# Patient Record
Sex: MALE | Race: WHITE | NOT HISPANIC OR LATINO | Employment: STUDENT | URBAN - METROPOLITAN AREA
[De-identification: names, ages, dates, MRNs, and addresses within clinical notes are randomized per-mention and may not be internally consistent; named-entity substitution may affect disease eponyms.]

---

## 2020-06-01 ENCOUNTER — OFFICE VISIT (OUTPATIENT)
Dept: FAMILY MEDICINE CLINIC | Facility: CLINIC | Age: 25
End: 2020-06-01

## 2020-06-01 VITALS
HEIGHT: 69 IN | BODY MASS INDEX: 30.21 KG/M2 | SYSTOLIC BLOOD PRESSURE: 122 MMHG | DIASTOLIC BLOOD PRESSURE: 80 MMHG | OXYGEN SATURATION: 98 % | WEIGHT: 204 LBS | HEART RATE: 60 BPM

## 2020-06-01 DIAGNOSIS — R74.8 ELEVATED LIVER ENZYMES: ICD-10-CM

## 2020-06-01 DIAGNOSIS — Z13.1 SCREENING FOR DIABETES MELLITUS: ICD-10-CM

## 2020-06-01 DIAGNOSIS — R10.11 RUQ PAIN: ICD-10-CM

## 2020-06-01 DIAGNOSIS — Z13.6 SCREENING FOR CARDIOVASCULAR CONDITION: ICD-10-CM

## 2020-06-01 DIAGNOSIS — Z00.00 PREVENTATIVE HEALTH CARE: Primary | ICD-10-CM

## 2020-06-01 DIAGNOSIS — Z23 NEED FOR MMR VACCINE: ICD-10-CM

## 2020-06-01 DIAGNOSIS — F41.9 ANXIETY: ICD-10-CM

## 2020-06-01 DIAGNOSIS — R63.5 WEIGHT GAIN: ICD-10-CM

## 2020-06-01 DIAGNOSIS — E55.9 VITAMIN D DEFICIENCY: ICD-10-CM

## 2020-06-01 DIAGNOSIS — K21.9 GASTROESOPHAGEAL REFLUX DISEASE, ESOPHAGITIS PRESENCE NOT SPECIFIED: ICD-10-CM

## 2020-06-01 DIAGNOSIS — Z23 NEED FOR HEPATITIS B VACCINATION: ICD-10-CM

## 2020-06-01 PROCEDURE — 90471 IMMUNIZATION ADMIN: CPT | Performed by: INTERNAL MEDICINE

## 2020-06-01 PROCEDURE — 90746 HEPB VACCINE 3 DOSE ADULT IM: CPT | Performed by: INTERNAL MEDICINE

## 2020-06-01 PROCEDURE — 90707 MMR VACCINE SC: CPT | Performed by: INTERNAL MEDICINE

## 2020-06-01 PROCEDURE — 99385 PREV VISIT NEW AGE 18-39: CPT | Performed by: INTERNAL MEDICINE

## 2020-06-01 PROCEDURE — 90472 IMMUNIZATION ADMIN EACH ADD: CPT | Performed by: INTERNAL MEDICINE

## 2020-06-01 RX ORDER — BUSPIRONE HYDROCHLORIDE 5 MG/1
5 TABLET ORAL 2 TIMES DAILY
COMMUNITY
Start: 2020-05-08 | End: 2020-06-02 | Stop reason: SDUPTHER

## 2020-06-01 RX ORDER — HYDROXYZINE PAMOATE 25 MG/1
25 CAPSULE ORAL 3 TIMES DAILY PRN
COMMUNITY

## 2020-06-02 RX ORDER — BUSPIRONE HYDROCHLORIDE 5 MG/1
5 TABLET ORAL 2 TIMES DAILY
Qty: 180 TABLET | Refills: 3 | Status: SHIPPED | OUTPATIENT
Start: 2020-06-02 | End: 2020-10-05

## 2020-06-02 NOTE — PROGRESS NOTES
Chief Complaint   Patient presents with   • Annual Exam   • Immunizations     hep B, MMR       HPI:  Ralph Mckeon is a 24 y.o. male who presents today for annual exam and establish care.  Generalized anxiety-stable on BuSpar twice daily, hydroxyzine as needed, rare use.  History of elevated liver enzymes- on previous ultrasound.  He does have some right upper quadrant tenderness after large high fatty meals.  Symptoms are rare.  GERD-Tums as needed.    ROS:  Constitutional: no fevers, night sweats or unexplained weight loss  Eyes: no vision changes  ENT: no runny nose, ear pain, sore throat  Cardio: no chest pain, palpitations  Pulm: no shortness of breath, wheezing, or cough  GI: +abdominal pain - changes in bowel movements  : no difficulty urinating  MSK: no difficulty ambulating, no joint pain  Neuro: no weakness, dizziness or headache  Psych: no trouble sleeping  Endo: no change in appetite      Past Medical History:   Diagnosis Date   • Anxiety       Family History   Problem Relation Age of Onset   • Heart disease Mother    • Cancer Mother    • Kidney disease Mother    • Diabetes Mother    • Diabetes Maternal Grandmother    • Cancer Maternal Grandmother    • Heart disease Maternal Grandmother    • Kidney disease Maternal Grandmother       Social History     Socioeconomic History   • Marital status: Single     Spouse name: Not on file   • Number of children: Not on file   • Years of education: Not on file   • Highest education level: Not on file   Tobacco Use   • Smoking status: Never Smoker   • Smokeless tobacco: Never Used   Substance and Sexual Activity   • Alcohol use: Yes     Alcohol/week: 1.0 - 2.0 standard drinks     Types: 1 - 2 Glasses of wine per week     Frequency: Never     Comment: socially   • Drug use: Never   • Sexual activity: Defer      Allergies   Allergen Reactions   • Ceclor [Cefaclor] Rash      Immunization History   Administered Date(s) Administered   • Hepatitis B Vaccine Adult IM  06/01/2020   • MMR 06/01/2020        PE:  Vitals:    06/01/20 1638   BP: 122/80   Pulse: 60   SpO2: 98%      Body mass index is 29.7 kg/m².    Gen Appearance: NAD  HEENT: Normocephalic, PERRLA, no thyromegaly, trache midline  Heart: RRR, normal S1 and S2, no murmur  Lungs: CTA b/l, no wheezing, no crackles  Abdomen: Soft, non-tender, non-distended, no guarding and BSx4  MSK: Moves all extremities well, normal gait, no peripheral edema  Pulses: Palpable and equal b/l  Lymph nodes: No palpable lymphadenopathy   Neuro: No focal deficits      Current Outpatient Medications   Medication Sig Dispense Refill   • busPIRone (BUSPAR) 5 MG tablet Take 1 tablet by mouth 2 (Two) Times a Day. 180 tablet 3   • hydrOXYzine pamoate (VISTARIL) 25 MG capsule Take 25 mg by mouth 3 (Three) Times a Day As Needed for Itching or Anxiety.       No current facility-administered medications for this visit.         Ralph was seen today for annual exam and immunizations.    Diagnoses and all orders for this visit:    Preventative health care  Counseled on healthy weight, nutrition, physical activity, cancer screening, and immunizations.    Need for hepatitis B vaccination  -     Hepatitis B Vaccine Adult IM    Need for MMR vaccine  -     MMR Vaccine Subcutaneous    Screening for cardiovascular condition  -     CBC & Differential; Future  -     Comprehensive Metabolic Panel; Future  -     Hemoglobin A1c; Future  -     Lipid Panel; Future  -     Urinalysis With Culture If Indicated - Urine, Clean Catch; Future    Screening for diabetes mellitus  -     Hemoglobin A1c; Future  -     Urinalysis With Culture If Indicated - Urine, Clean Catch; Future    Elevated liver enzymes  -     CBC & Differential; Future  -     Comprehensive Metabolic Panel; Future  -     Urinalysis With Culture If Indicated - Urine, Clean Catch; Future  Likely fatty liver based on history.  Counseled on weight loss and increasing physical activity.  Will need yearly liver  enzymes.  Gastroesophageal reflux disease, esophagitis presence not specified  Continue Tums as needed.  Recommend Pepcid if symptoms persist.  Anxiety  -     busPIRone (BUSPAR) 5 MG tablet; Take 1 tablet by mouth 2 (Two) Times a Day.    RUQ pain  -     CBC & Differential; Future  -     Comprehensive Metabolic Panel; Future  -     Hemoglobin A1c; Future  -     Lipid Panel; Future  -     Urinalysis With Culture If Indicated - Urine, Clean Catch; Future  Checking blood work.  Vitamin D deficiency  -     Vitamin D 25 Hydroxy; Future  History of vitamin D deficiency, currently not taking supplements.  Weight gain  -     Urinalysis With Culture If Indicated - Urine, Clean Catch; Future  -     TSH+Free T4; Future         Return in about 1 year (around 6/1/2021) for Annual.     Please note that portions of this document were completed with a voice recognition program. Efforts were made to edit the dictations, but occasionally words are mis-transcribed.

## 2020-06-03 ENCOUNTER — LAB (OUTPATIENT)
Dept: LAB | Facility: HOSPITAL | Age: 25
End: 2020-06-03

## 2020-06-03 DIAGNOSIS — Z13.6 SCREENING FOR CARDIOVASCULAR CONDITION: ICD-10-CM

## 2020-06-03 DIAGNOSIS — Z13.1 SCREENING FOR DIABETES MELLITUS: ICD-10-CM

## 2020-06-03 DIAGNOSIS — R74.8 ELEVATED LIVER ENZYMES: ICD-10-CM

## 2020-06-03 DIAGNOSIS — R10.11 RUQ PAIN: ICD-10-CM

## 2020-06-03 DIAGNOSIS — E55.9 VITAMIN D DEFICIENCY: ICD-10-CM

## 2020-06-03 DIAGNOSIS — R63.5 WEIGHT GAIN: ICD-10-CM

## 2020-06-03 LAB
25(OH)D3 SERPL-MCNC: 18.5 NG/ML (ref 30–100)
ALBUMIN SERPL-MCNC: 4.6 G/DL (ref 3.5–5.2)
ALBUMIN/GLOB SERPL: 2.1 G/DL
ALP SERPL-CCNC: 71 U/L (ref 39–117)
ALT SERPL W P-5'-P-CCNC: 15 U/L (ref 1–41)
ANION GAP SERPL CALCULATED.3IONS-SCNC: 10.3 MMOL/L (ref 5–15)
AST SERPL-CCNC: 20 U/L (ref 1–40)
BASOPHILS # BLD AUTO: 0.04 10*3/MM3 (ref 0–0.2)
BASOPHILS NFR BLD AUTO: 0.6 % (ref 0–1.5)
BILIRUB SERPL-MCNC: 0.4 MG/DL (ref 0.2–1.2)
BUN BLD-MCNC: 14 MG/DL (ref 6–20)
BUN/CREAT SERPL: 21.9 (ref 7–25)
CALCIUM SPEC-SCNC: 9.4 MG/DL (ref 8.6–10.5)
CHLORIDE SERPL-SCNC: 107 MMOL/L (ref 98–107)
CHOLEST SERPL-MCNC: 113 MG/DL (ref 0–200)
CO2 SERPL-SCNC: 24.7 MMOL/L (ref 22–29)
CREAT BLD-MCNC: 0.64 MG/DL (ref 0.76–1.27)
DEPRECATED RDW RBC AUTO: 39.1 FL (ref 37–54)
EOSINOPHIL # BLD AUTO: 0.09 10*3/MM3 (ref 0–0.4)
EOSINOPHIL NFR BLD AUTO: 1.3 % (ref 0.3–6.2)
ERYTHROCYTE [DISTWIDTH] IN BLOOD BY AUTOMATED COUNT: 11.8 % (ref 12.3–15.4)
GFR SERPL CREATININE-BSD FRML MDRD: >150 ML/MIN/1.73
GLOBULIN UR ELPH-MCNC: 2.2 GM/DL
GLUCOSE BLD-MCNC: 89 MG/DL (ref 65–99)
HBA1C MFR BLD: 4.9 % (ref 4.8–5.6)
HCT VFR BLD AUTO: 43.4 % (ref 37.5–51)
HDLC SERPL-MCNC: 55 MG/DL (ref 40–60)
HGB BLD-MCNC: 15.3 G/DL (ref 13–17.7)
IMM GRANULOCYTES # BLD AUTO: 0.02 10*3/MM3 (ref 0–0.05)
IMM GRANULOCYTES NFR BLD AUTO: 0.3 % (ref 0–0.5)
LDLC SERPL CALC-MCNC: 49 MG/DL (ref 0–100)
LDLC/HDLC SERPL: 0.89 {RATIO}
LYMPHOCYTES # BLD AUTO: 1.98 10*3/MM3 (ref 0.7–3.1)
LYMPHOCYTES NFR BLD AUTO: 28.7 % (ref 19.6–45.3)
MCH RBC QN AUTO: 32 PG (ref 26.6–33)
MCHC RBC AUTO-ENTMCNC: 35.3 G/DL (ref 31.5–35.7)
MCV RBC AUTO: 90.8 FL (ref 79–97)
MONOCYTES # BLD AUTO: 0.46 10*3/MM3 (ref 0.1–0.9)
MONOCYTES NFR BLD AUTO: 6.7 % (ref 5–12)
NEUTROPHILS # BLD AUTO: 4.31 10*3/MM3 (ref 1.7–7)
NEUTROPHILS NFR BLD AUTO: 62.4 % (ref 42.7–76)
NRBC BLD AUTO-RTO: 0 /100 WBC (ref 0–0.2)
PLATELET # BLD AUTO: 187 10*3/MM3 (ref 140–450)
PMV BLD AUTO: 10.1 FL (ref 6–12)
POTASSIUM BLD-SCNC: 4.4 MMOL/L (ref 3.5–5.2)
PROT SERPL-MCNC: 6.8 G/DL (ref 6–8.5)
RBC # BLD AUTO: 4.78 10*6/MM3 (ref 4.14–5.8)
SODIUM BLD-SCNC: 142 MMOL/L (ref 136–145)
T4 FREE SERPL-MCNC: 1.31 NG/DL (ref 0.93–1.7)
TRIGL SERPL-MCNC: 44 MG/DL (ref 0–150)
TSH SERPL DL<=0.05 MIU/L-ACNC: 1.74 UIU/ML (ref 0.27–4.2)
VLDLC SERPL-MCNC: 8.8 MG/DL (ref 5–40)
WBC NRBC COR # BLD: 6.9 10*3/MM3 (ref 3.4–10.8)

## 2020-06-03 PROCEDURE — 84443 ASSAY THYROID STIM HORMONE: CPT

## 2020-06-03 PROCEDURE — 80061 LIPID PANEL: CPT

## 2020-06-03 PROCEDURE — 81003 URINALYSIS AUTO W/O SCOPE: CPT

## 2020-06-03 PROCEDURE — 80053 COMPREHEN METABOLIC PANEL: CPT

## 2020-06-03 PROCEDURE — 84439 ASSAY OF FREE THYROXINE: CPT

## 2020-06-03 PROCEDURE — 83036 HEMOGLOBIN GLYCOSYLATED A1C: CPT

## 2020-06-03 PROCEDURE — 82306 VITAMIN D 25 HYDROXY: CPT

## 2020-06-03 PROCEDURE — 85025 COMPLETE CBC W/AUTO DIFF WBC: CPT

## 2020-06-04 LAB
BILIRUB UR QL STRIP: NEGATIVE
CLARITY UR: CLEAR
COLOR UR: YELLOW
GLUCOSE UR STRIP-MCNC: NEGATIVE MG/DL
HGB UR QL STRIP.AUTO: NEGATIVE
KETONES UR QL STRIP: NEGATIVE
LEUKOCYTE ESTERASE UR QL STRIP.AUTO: NEGATIVE
NITRITE UR QL STRIP: NEGATIVE
PH UR STRIP.AUTO: 6 [PH] (ref 5–8)
PROT UR QL STRIP: NEGATIVE
SP GR UR STRIP: 1.02 (ref 1–1.03)
UROBILINOGEN UR QL STRIP: NORMAL

## 2020-06-05 ENCOUNTER — TELEPHONE (OUTPATIENT)
Dept: FAMILY MEDICINE CLINIC | Facility: CLINIC | Age: 25
End: 2020-06-05

## 2020-06-05 NOTE — TELEPHONE ENCOUNTER
Spoke with patient and advised him that the forms he dropped off for Dr. Tillman to complete have been completed except an eye exam. I stated that the patient can stop by for a nurse visit to have this completed so we can update the form and he can take it.   The patient is also stopping to get a TB skin the same day.   Forms will be at my desk. Will need to make a copy to scan into chart before patient takes it with him.

## 2020-06-08 DIAGNOSIS — E55.9 VITAMIN D DEFICIENCY: Primary | ICD-10-CM

## 2020-06-08 RX ORDER — ERGOCALCIFEROL 1.25 MG/1
50000 CAPSULE ORAL WEEKLY
Qty: 12 CAPSULE | Refills: 0 | Status: SHIPPED | OUTPATIENT
Start: 2020-06-08 | End: 2020-09-06

## 2020-06-17 ENCOUNTER — CLINICAL SUPPORT (OUTPATIENT)
Dept: FAMILY MEDICINE CLINIC | Facility: CLINIC | Age: 25
End: 2020-06-17

## 2020-06-17 VITALS
BODY MASS INDEX: 31.59 KG/M2 | WEIGHT: 213.3 LBS | TEMPERATURE: 97.3 F | DIASTOLIC BLOOD PRESSURE: 72 MMHG | HEART RATE: 56 BPM | SYSTOLIC BLOOD PRESSURE: 118 MMHG | HEIGHT: 69 IN | OXYGEN SATURATION: 98 %

## 2020-06-17 DIAGNOSIS — Z11.1 VISIT FOR TB SKIN TEST: Primary | ICD-10-CM

## 2020-06-17 PROCEDURE — 86580 TB INTRADERMAL TEST: CPT | Performed by: INTERNAL MEDICINE

## 2020-06-19 LAB
INDURATION: 0 MM (ref 0–10)
TB SKIN TEST: NEGATIVE

## 2020-07-07 ENCOUNTER — TELEPHONE (OUTPATIENT)
Dept: FAMILY MEDICINE CLINIC | Facility: CLINIC | Age: 25
End: 2020-07-07

## 2020-07-07 NOTE — TELEPHONE ENCOUNTER
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

## 2020-07-08 DIAGNOSIS — Z23 IMMUNIZATION DUE: Primary | ICD-10-CM

## 2020-07-10 ENCOUNTER — LAB (OUTPATIENT)
Dept: LAB | Facility: HOSPITAL | Age: 25
End: 2020-07-10

## 2020-07-10 DIAGNOSIS — Z23 IMMUNIZATION DUE: ICD-10-CM

## 2020-07-10 PROCEDURE — 86706 HEP B SURFACE ANTIBODY: CPT

## 2020-07-10 PROCEDURE — 86765 RUBEOLA ANTIBODY: CPT

## 2020-07-10 PROCEDURE — 86762 RUBELLA ANTIBODY: CPT

## 2020-07-10 PROCEDURE — 36415 COLL VENOUS BLD VENIPUNCTURE: CPT

## 2020-07-10 PROCEDURE — 86735 MUMPS ANTIBODY: CPT

## 2020-07-10 PROCEDURE — 86787 VARICELLA-ZOSTER ANTIBODY: CPT

## 2020-07-11 LAB
HBV SURFACE AB SER RIA-ACNC: NORMAL
VZV IGG SER IA-ACNC: POSITIVE

## 2020-07-13 LAB
MEV IGG SER IA-ACNC: <13.5 AU/ML
MUV IGG SER IA-ACNC: <9 AU/ML
RUBV IGG SERPL IA-ACNC: 4.52 INDEX

## 2020-07-15 ENCOUNTER — TELEPHONE (OUTPATIENT)
Dept: FAMILY MEDICINE CLINIC | Facility: CLINIC | Age: 25
End: 2020-07-15

## 2020-07-15 NOTE — TELEPHONE ENCOUNTER
Attempted to contact, no answer LM provided lab results. I advised him to call us back with any further questions

## 2020-07-15 NOTE — TELEPHONE ENCOUNTER
Caller: Ralph Mckeon    Relationship: Self    Best call back number:    Caller requesting test results: Patient    What test was performed: routine bloodwork    When was the test performed: 7/10/20    Where was the test performed: in office    Additional notes: patient would like someone to go over his test results with him.

## 2020-07-15 NOTE — TELEPHONE ENCOUNTER
Blood work looks fine. He had less than adequate immunity to mumps and rubeloa, butt it looks as if Layne wanted hime to get MMR vaccine at last visit. Had no immunity to Hep B and was to get vacc as well. othrwise hold any other questions for Dr. Tillman

## 2020-07-16 PROCEDURE — U0003 INFECTIOUS AGENT DETECTION BY NUCLEIC ACID (DNA OR RNA); SEVERE ACUTE RESPIRATORY SYNDROME CORONAVIRUS 2 (SARS-COV-2) (CORONAVIRUS DISEASE [COVID-19]), AMPLIFIED PROBE TECHNIQUE, MAKING USE OF HIGH THROUGHPUT TECHNOLOGIES AS DESCRIBED BY CMS-2020-01-R: HCPCS | Performed by: NURSE PRACTITIONER

## 2020-07-19 ENCOUNTER — TELEPHONE (OUTPATIENT)
Dept: URGENT CARE | Facility: CLINIC | Age: 25
End: 2020-07-19

## 2020-07-19 NOTE — TELEPHONE ENCOUNTER
Discussed test results with patient.  States is not having any symptoms, free of fever.  Discussed CDC recommendations for individuals exposed to COVID-19 virus.  Patient voiced understanding

## 2020-09-01 DIAGNOSIS — E55.9 VITAMIN D DEFICIENCY: ICD-10-CM

## 2020-09-01 RX ORDER — ERGOCALCIFEROL 1.25 MG/1
50000 CAPSULE ORAL WEEKLY
Qty: 12 CAPSULE | Refills: 0 | OUTPATIENT
Start: 2020-09-01 | End: 2020-11-30

## 2020-10-05 ENCOUNTER — OFFICE VISIT (OUTPATIENT)
Dept: FAMILY MEDICINE CLINIC | Facility: CLINIC | Age: 25
End: 2020-10-05

## 2020-10-05 VITALS
WEIGHT: 218 LBS | SYSTOLIC BLOOD PRESSURE: 118 MMHG | HEART RATE: 81 BPM | OXYGEN SATURATION: 98 % | HEIGHT: 69 IN | DIASTOLIC BLOOD PRESSURE: 74 MMHG | BODY MASS INDEX: 32.29 KG/M2

## 2020-10-05 DIAGNOSIS — Z23 NEED FOR HEPATITIS B VACCINATION: ICD-10-CM

## 2020-10-05 DIAGNOSIS — E55.9 VITAMIN D DEFICIENCY: Primary | ICD-10-CM

## 2020-10-05 DIAGNOSIS — Z23 FLU VACCINE NEED: ICD-10-CM

## 2020-10-05 DIAGNOSIS — F41.1 GENERALIZED ANXIETY DISORDER: ICD-10-CM

## 2020-10-05 PROCEDURE — 90686 IIV4 VACC NO PRSV 0.5 ML IM: CPT | Performed by: INTERNAL MEDICINE

## 2020-10-05 PROCEDURE — 90471 IMMUNIZATION ADMIN: CPT | Performed by: INTERNAL MEDICINE

## 2020-10-05 PROCEDURE — 90746 HEPB VACCINE 3 DOSE ADULT IM: CPT | Performed by: INTERNAL MEDICINE

## 2020-10-05 PROCEDURE — 90472 IMMUNIZATION ADMIN EACH ADD: CPT | Performed by: INTERNAL MEDICINE

## 2020-10-05 PROCEDURE — 99214 OFFICE O/P EST MOD 30 MIN: CPT | Performed by: INTERNAL MEDICINE

## 2020-10-05 RX ORDER — ERGOCALCIFEROL 1.25 MG/1
CAPSULE ORAL
Qty: 12 CAPSULE | Refills: 0 | Status: SHIPPED | OUTPATIENT
Start: 2020-10-05 | End: 2020-12-21

## 2020-10-05 RX ORDER — BUSPIRONE HYDROCHLORIDE 10 MG/1
10 TABLET ORAL 2 TIMES DAILY
Qty: 60 TABLET | Refills: 5 | Status: SHIPPED | OUTPATIENT
Start: 2020-10-05 | End: 2020-10-29 | Stop reason: SDUPTHER

## 2020-10-05 NOTE — PROGRESS NOTES
Chief Complaint   Patient presents with   • Anxiety       HPI:  Ralph Mckeon is a 24 y.o. male who presents today for follow-up.  Currently taking BuSpar 5 mg twice daily for anxiety.  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  He feels like BuSpar is not effective any longer.  He would like to either increase the dose or switch the medication.  He has finished his prescription vitamin D dose, currently not taking supplementation.    ROS:  Constitutional: no fevers, night sweats or unexplained weight loss  Eyes: no vision changes  ENT: no runny nose, ear pain, sore throat  Cardio: no chest pain, palpitations  Pulm: no shortness of breath, wheezing, or cough  GI: no abdominal pain or changes in bowel movements  : no difficulty urinating  MSK: no difficulty ambulating, no joint pain  Neuro: no weakness, dizziness or headache  Psych: no trouble sleeping  Endo: no change in appetite      Past Medical History:   Diagnosis Date   • Anxiety       Family History   Problem Relation Age of Onset   • Heart disease Mother    • Cancer Mother    • Kidney disease Mother    • Diabetes Mother    • Diabetes Maternal Grandmother    • Cancer Maternal Grandmother    • Heart disease Maternal Grandmother    • Kidney disease Maternal Grandmother       Social History     Socioeconomic History   • Marital status: Single     Spouse name: Not on file   • Number of children: Not on file   • Years of education: Not on file   • Highest education level: Not on file   Tobacco Use   • Smoking status: Never Smoker   • Smokeless tobacco: Never Used   Substance and Sexual Activity   • Alcohol use: Yes     Alcohol/week: 1.0 - 2.0 standard drinks     Types: 1 - 2 Glasses of wine per week     Frequency: Never     Comment: socially   • Drug use: Never   • Sexual activity: Defer      Allergies   Allergen Reactions   • Ceclor [Cefaclor] Rash      Immunization History   Administered  Date(s) Administered   • DTaP, Unspecified 02/07/1994, 05/05/1994, 11/01/1994, 04/01/1998   • Flu Vaccine Quad PF >36MO 10/29/2019   • Flulaval/Fluarix Quad 10/05/2020   • Hep B, Unspecified 1993, 02/09/1994, 11/01/1994   • Hepatitis A 09/04/2019   • Hepatitis B Vaccine Adult IM 06/01/2020, 10/05/2020   • Hib (PRP-T) 02/07/1994, 05/05/1994, 11/01/1994   • MMR 08/15/1995, 04/01/1998, 06/01/2020   • Meningococcal, Unspecified 03/04/2016   • PPD Test 06/17/2020   • Polio, Unspecified 02/07/1994, 05/05/1994, 11/01/1994, 04/01/1998   • Td, Not Adsorbed 08/11/2005   • Tdap 09/04/2019   • Varicella 09/11/2019, 10/30/2019        PE:  Vitals:    10/05/20 1050   BP: 118/74   Pulse: 81   SpO2: 98%      Body mass index is 31.74 kg/m².    Gen Appearance: NAD  HEENT: Normocephalic, PERRLA, no thyromegaly, trache midline  Heart: RRR, normal S1 and S2, no murmur  Lungs: CTA b/l, no wheezing, no crackles  Abdomen: Soft, non-tender, non-distended, no guarding and BSx4  MSK: Moves all extremities well, normal gait, no peripheral edema  Pulses: Palpable and equal b/l  Lymph nodes: No palpable lymphadenopathy   Neuro: No focal deficits      Current Outpatient Medications   Medication Sig Dispense Refill   • hydrOXYzine pamoate (VISTARIL) 25 MG capsule Take 25 mg by mouth 3 (Three) Times a Day As Needed for Itching or Anxiety.     • busPIRone (BUSPAR) 10 MG tablet Take 1 tablet by mouth 2 (two) times a day. 60 tablet 5   • vitamin D (ERGOCALCIFEROL) 1.25 MG (99794 UT) capsule capsule Take 1 tablet once weekly with meals. 12 capsule 0     No current facility-administered medications for this visit.         Ralph was seen today for anxiety.    Diagnoses and all orders for this visit:    Vitamin D deficiency  -     vitamin D (ERGOCALCIFEROL) 1.25 MG (18224 UT) capsule capsule; Take 1 tablet once weekly with meals.  Recommend repeating prescription weekly dosing for 3 months.  Recommend 5000 units over-the-counter vitamin D3 daily  afterwards.  Will recheck levels at next blood draw.  Generalized anxiety disorder  -     busPIRone (BUSPAR) 10 MG tablet; Take 1 tablet by mouth 2 (two) times a day.  Increase BuSpar to 10 mg twice daily.  If not effective after 4 to 6 weeks recommend trial of SNRI.  He has a concurrent concentration difficulties as well.  Flu vaccine need  -     Fluarix Quad >6 Months (5990-4167)    Need for hepatitis B vaccination  -     Hepatitis B Vaccine Adult IM         No follow-ups on file.     Please note that portions of this document were completed with a voice recognition program. Efforts were made to edit the dictations, but occasionally words are mis-transcribed.

## 2020-10-29 DIAGNOSIS — F41.1 GENERALIZED ANXIETY DISORDER: ICD-10-CM

## 2020-10-30 RX ORDER — BUSPIRONE HYDROCHLORIDE 10 MG/1
10 TABLET ORAL 2 TIMES DAILY
Qty: 180 TABLET | Refills: 1 | Status: SHIPPED | OUTPATIENT
Start: 2020-10-30 | End: 2021-05-20

## 2020-12-21 DIAGNOSIS — E55.9 VITAMIN D DEFICIENCY: ICD-10-CM

## 2020-12-21 RX ORDER — ERGOCALCIFEROL 1.25 MG/1
CAPSULE ORAL
Qty: 12 CAPSULE | Refills: 0 | Status: SHIPPED | OUTPATIENT
Start: 2020-12-21

## 2021-03-05 ENCOUNTER — LAB (OUTPATIENT)
Dept: LAB | Facility: HOSPITAL | Age: 26
End: 2021-03-05

## 2021-03-05 ENCOUNTER — OFFICE VISIT (OUTPATIENT)
Dept: FAMILY MEDICINE CLINIC | Facility: CLINIC | Age: 26
End: 2021-03-05

## 2021-03-05 VITALS
SYSTOLIC BLOOD PRESSURE: 122 MMHG | DIASTOLIC BLOOD PRESSURE: 70 MMHG | WEIGHT: 213 LBS | OXYGEN SATURATION: 99 % | HEIGHT: 69 IN | BODY MASS INDEX: 31.55 KG/M2 | HEART RATE: 78 BPM

## 2021-03-05 DIAGNOSIS — B35.1 ONYCHOMYCOSIS: Primary | ICD-10-CM

## 2021-03-05 DIAGNOSIS — M54.6 CHRONIC RIGHT-SIDED THORACIC BACK PAIN: ICD-10-CM

## 2021-03-05 DIAGNOSIS — K76.0 FATTY LIVER: ICD-10-CM

## 2021-03-05 DIAGNOSIS — Z23 IMMUNIZATION DUE: ICD-10-CM

## 2021-03-05 DIAGNOSIS — G89.29 CHRONIC RIGHT-SIDED THORACIC BACK PAIN: ICD-10-CM

## 2021-03-05 LAB — HBV SURFACE AB SER RIA-ACNC: REACTIVE

## 2021-03-05 PROCEDURE — 36415 COLL VENOUS BLD VENIPUNCTURE: CPT

## 2021-03-05 PROCEDURE — 99214 OFFICE O/P EST MOD 30 MIN: CPT | Performed by: INTERNAL MEDICINE

## 2021-03-05 PROCEDURE — 86706 HEP B SURFACE ANTIBODY: CPT

## 2021-03-05 NOTE — PROGRESS NOTES
Chief Complaint   Patient presents with   • Back Pain     x 2 weeks - injuries in the past nothing pertaining to this specific time.    • Nail Problem     bilateral feet, right gets worse.        HPI:  Ralph Mckeon is a 25 y.o. male who presents today for chronic right-sided back pain.  Right-sided rib injury from years ago.  He reports worsening back and right-sided pain for the last several weeks.  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  He would like to discuss fungal infection on his feet.    ROS:  Constitutional: no fevers, night sweats or unexplained weight loss  Eyes: no vision changes  ENT: no runny nose, ear pain, sore throat  Cardio: no chest pain, palpitations  Pulm: no shortness of breath, wheezing, or cough  GI: no abdominal pain or changes in bowel movements  : no difficulty urinating  MSK: no difficulty ambulating, no joint pain  Neuro: no weakness, dizziness or headache  Psych: no trouble sleeping  Endo: no change in appetite      Past Medical History:   Diagnosis Date   • Anxiety       Family History   Problem Relation Age of Onset   • Heart disease Mother    • Cancer Mother    • Kidney disease Mother    • Diabetes Mother    • Diabetes Maternal Grandmother    • Cancer Maternal Grandmother    • Heart disease Maternal Grandmother    • Kidney disease Maternal Grandmother       Social History     Socioeconomic History   • Marital status: Single     Spouse name: Not on file   • Number of children: Not on file   • Years of education: Not on file   • Highest education level: Not on file   Tobacco Use   • Smoking status: Never Smoker   • Smokeless tobacco: Never Used   Substance and Sexual Activity   • Alcohol use: Yes     Alcohol/week: 1.0 - 2.0 standard drinks     Types: 1 - 2 Glasses of wine per week     Frequency: Never     Comment: socially   • Drug use: Never   • Sexual activity: Defer      Allergies   Allergen Reactions   • Ceclor [Cefaclor] Rash      Immunization History    Administered Date(s) Administered   • DTaP, Unspecified 02/07/1994, 05/05/1994, 11/01/1994, 04/01/1998   • Flu Vaccine Quad PF >36MO 10/29/2019   • Flulaval/Fluarix/Fluzone Quad 10/05/2020   • Hep B, Unspecified 1993, 02/09/1994, 11/01/1994   • Hepatitis A 09/04/2019   • Hepatitis B Vaccine Adult IM 06/01/2020, 10/05/2020   • Hib (PRP-T) 02/07/1994, 05/05/1994, 11/01/1994   • MMR 08/15/1995, 04/01/1998, 06/01/2020   • Meningococcal, Unspecified 03/04/2016   • PPD Test 06/17/2020   • Polio, Unspecified 02/07/1994, 05/05/1994, 11/01/1994, 04/01/1998   • Td, Not Adsorbed 08/11/2005   • Tdap 09/04/2019   • Varicella 09/11/2019, 10/30/2019        PE:  Vitals:    03/05/21 1502   BP: 122/70   Pulse: 78   SpO2: 99%      Body mass index is 31.01 kg/m².    Gen Appearance: NAD  HEENT: Normocephalic, PERRLA, no thyromegaly, trache midline  Heart: RRR, normal S1 and S2, no murmur  Lungs: CTA b/l, no wheezing, no crackles  Abdomen: Soft, non-tender, non-distended, no guarding and BSx4  MSK: Moves all extremities well, normal gait, no peripheral edema, paraspinal hypertrophy and muscle spasm right thoracic spine  Pulses: Palpable and equal b/l  Lymph nodes: No palpable lymphadenopathy   Neuro: No focal deficits      Current Outpatient Medications   Medication Sig Dispense Refill   • busPIRone (BUSPAR) 10 MG tablet Take 1 tablet by mouth 2 (two) times a day. 180 tablet 1   • hydrOXYzine pamoate (VISTARIL) 25 MG capsule Take 25 mg by mouth 3 (Three) Times a Day As Needed for Itching or Anxiety.     • vitamin D (ERGOCALCIFEROL) 1.25 MG (55209 UT) capsule capsule TAKE 1 TABLET ONCE WEEKLY WITH MEALS. 12 capsule 0     No current facility-administered medications for this visit.         Diagnoses and all orders for this visit:    1. Onychomycosis (Primary)  Referring to podiatry to establish care.  2. Chronic right-sided thoracic back pain  Patient would like to try OMT sessions at his school.  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  If this is not effective will need PT.  3. Immunization due  -     Hepatitis B surface antibody; Future    4. Fatty liver  Would recommend avoiding terbinafine due to fatty liver history.  Discuss with podiatry.       No follow-ups on file.     Please note that portions of this document were completed with a voice recognition program. Efforts were made to edit the dictations, but occasionally words are mis-transcribed.  Answers for HPI/ROS submitted by the patient on 3/5/2021   Back pain  What is the primary reason for your visit?: Back Pain  Chronicity: recurrent  Onset: 1 to 4 weeks ago  Frequency: constantly  Progression since onset: waxing and waning  Pain location: thoracic spine  Pain quality: stabbing  Pain - numeric: 4/10  Pain is: the same all the time  Aggravated by: bending, twisting  Stiffness is present: all day  abdominal pain: No  bladder incontinence: No  bowel incontinence: No  chest pain: No  dysuria: No  fever: No  headaches: No  leg pain: No  numbness: No  paresthesias: No  pelvic pain: No  perianal numbness: No  tingling: No  weakness: No  weight loss: No  Risk factors: poor posture

## 2021-05-20 DIAGNOSIS — F41.1 GENERALIZED ANXIETY DISORDER: ICD-10-CM

## 2021-05-20 RX ORDER — BUSPIRONE HYDROCHLORIDE 10 MG/1
TABLET ORAL
Qty: 180 TABLET | Refills: 1 | Status: SHIPPED | OUTPATIENT
Start: 2021-05-20 | End: 2021-08-09 | Stop reason: SDUPTHER

## 2021-08-09 ENCOUNTER — OFFICE VISIT (OUTPATIENT)
Dept: FAMILY MEDICINE CLINIC | Facility: CLINIC | Age: 26
End: 2021-08-09

## 2021-08-09 VITALS
BODY MASS INDEX: 31.01 KG/M2 | SYSTOLIC BLOOD PRESSURE: 148 MMHG | DIASTOLIC BLOOD PRESSURE: 98 MMHG | WEIGHT: 213 LBS | OXYGEN SATURATION: 98 % | HEART RATE: 76 BPM

## 2021-08-09 DIAGNOSIS — B35.1 ONYCHOMYCOSIS: ICD-10-CM

## 2021-08-09 DIAGNOSIS — F41.1 GENERALIZED ANXIETY DISORDER: Primary | ICD-10-CM

## 2021-08-09 DIAGNOSIS — R41.840 DIFFICULTY CONCENTRATING: ICD-10-CM

## 2021-08-09 PROCEDURE — 99214 OFFICE O/P EST MOD 30 MIN: CPT | Performed by: INTERNAL MEDICINE

## 2021-08-09 RX ORDER — BUSPIRONE HYDROCHLORIDE 10 MG/1
10 TABLET ORAL 2 TIMES DAILY
Qty: 180 TABLET | Refills: 3 | Status: SHIPPED | OUTPATIENT
Start: 2021-08-09 | End: 2022-08-17

## 2021-08-09 NOTE — PROGRESS NOTES
Chief Complaint   Patient presents with   • Med Management   • ADD     concerns        HPI:  Ralph Mckeon is a 25 y.o. male who presents today for follow-up anxiety.  He would like to discuss ADHD.  This runs in his family and he has been difficulty concentrating at school.  He needs another referral to podiatry to establish care for fungal infection.    ROS:  Constitutional: no fevers, night sweats or unexplained weight loss  Eyes: no vision changes  ENT: no runny nose, ear pain, sore throat  Cardio: no chest pain, palpitations  Pulm: no shortness of breath, wheezing, or cough  GI: no abdominal pain or changes in bowel movements  : no difficulty urinating  MSK: no difficulty ambulating, no joint pain  Neuro: no weakness, dizziness or headache  Psych: no trouble sleeping  Endo: no change in appetite      Past Medical History:   Diagnosis Date   • Anxiety       Family History   Problem Relation Age of Onset   • Heart disease Mother    • Cancer Mother    • Kidney disease Mother    • Diabetes Mother    • Diabetes Maternal Grandmother    • Cancer Maternal Grandmother    • Heart disease Maternal Grandmother    • Kidney disease Maternal Grandmother       Social History     Socioeconomic History   • Marital status: Single     Spouse name: Not on file   • Number of children: Not on file   • Years of education: Not on file   • Highest education level: Not on file   Tobacco Use   • Smoking status: Never Smoker   • Smokeless tobacco: Never Used   Substance and Sexual Activity   • Alcohol use: Yes     Alcohol/week: 1.0 - 2.0 standard drinks     Types: 1 - 2 Glasses of wine per week     Comment: socially   • Drug use: Never   • Sexual activity: Defer      Allergies   Allergen Reactions   • Ceclor [Cefaclor] Rash      Immunization History   Administered Date(s) Administered   • DTaP, Unspecified 02/07/1994, 05/05/1994, 11/01/1994, 04/01/1998   • Flu Vaccine Quad PF >36MO 10/29/2019   • Flulaval/Fluarix/Fluzone Quad 10/05/2020    • Hep B, Unspecified 1993, 02/09/1994, 11/01/1994   • Hepatitis A 09/04/2019   • Hepatitis B Vaccine Adult IM 06/01/2020, 10/05/2020   • Hib (PRP-T) 02/07/1994, 05/05/1994, 11/01/1994   • MMR 08/15/1995, 04/01/1998, 06/01/2020   • Meningococcal, Unspecified 03/04/2016   • PPD Test 06/17/2020   • Polio, Unspecified 02/07/1994, 05/05/1994, 11/01/1994, 04/01/1998   • Td, Not Adsorbed 08/11/2005   • Tdap 09/04/2019   • Varicella 09/11/2019, 10/30/2019        PE:  Vitals:    08/09/21 1554   BP: 148/98   Pulse: 76   SpO2: 98%      Body mass index is 31.01 kg/m².    Gen Appearance: NAD  HEENT: Normocephalic, PERRLA, no thyromegaly, trache midline  Heart: RRR, normal S1 and S2, no murmur  Lungs: CTA b/l, no wheezing, no crackles  Abdomen: Soft, non-tender, non-distended, no guarding and BSx4  MSK: Moves all extremities well, normal gait, no peripheral edema  Pulses: Palpable and equal b/l  Lymph nodes: No palpable lymphadenopathy   Neuro: No focal deficits      Current Outpatient Medications   Medication Sig Dispense Refill   • busPIRone (BUSPAR) 10 MG tablet Take 1 tablet by mouth 2 (Two) Times a Day. 180 tablet 3   • ciclopirox (PENLAC) 8 % solution Apply  topically to the appropriate area as directed Every Night. 6 mL 2   • hydrOXYzine pamoate (VISTARIL) 25 MG capsule Take 25 mg by mouth 3 (Three) Times a Day As Needed for Itching or Anxiety.     • vitamin D (ERGOCALCIFEROL) 1.25 MG (23313 UT) capsule capsule TAKE 1 TABLET ONCE WEEKLY WITH MEALS. 12 capsule 0     No current facility-administered medications for this visit.      Continue BuSpar twice daily for now.  Blood pressure borderline elevated today.  Recommend checking at home over the next few weeks.  He plans on establishing care with psychology for ADHD testing.  Will make an appointment here after receiving results.    Diagnoses and all orders for this visit:    1. Generalized anxiety disorder (Primary)  -     busPIRone (BUSPAR) 10 MG tablet; Take 1  tablet by mouth 2 (Two) Times a Day.  Dispense: 180 tablet; Refill: 3    2. Difficulty concentrating    3. Onychomycosis  -     ciclopirox (PENLAC) 8 % solution; Apply  topically to the appropriate area as directed Every Night.  Dispense: 6 mL; Refill: 2  -     Ambulatory Referral to Podiatry         Return in about 1 year (around 8/9/2022) for Annual.     Please note that portions of this document were completed with a voice recognition program. Efforts were made to edit the dictations, but occasionally words are mis-transcribed.

## 2021-08-11 ENCOUNTER — TELEPHONE (OUTPATIENT)
Dept: FAMILY MEDICINE CLINIC | Facility: CLINIC | Age: 26
End: 2021-08-11

## 2021-08-11 DIAGNOSIS — R41.840 DIFFICULTY CONCENTRATING: Primary | ICD-10-CM

## 2021-08-11 NOTE — TELEPHONE ENCOUNTER
Pt notified that referral is in. Pt verbalized understanding and did not have any additional questions at this time.

## 2021-08-11 NOTE — TELEPHONE ENCOUNTER
Caller: Ralph Mckeon    Relationship: Self    Best call back number:     What is the medical concern/diagnosis: ADHD    What is the provider, practice or medical service name: [facility]? HE WASN'T SURE OF THE NAME    What is the office location: [facility]    What is the office phone number: [phone]    Any additional details: PATIENT SAID THEY SAID DR TY COULD JUST CALL FOR THE REFERRAL    PLEASE CALL PATIENT WHEN REFERRAL IS PLACED

## 2021-08-16 ENCOUNTER — TELEPHONE (OUTPATIENT)
Dept: FAMILY MEDICINE CLINIC | Facility: CLINIC | Age: 26
End: 2021-08-16

## 2021-12-08 ENCOUNTER — TELEPHONE (OUTPATIENT)
Dept: FAMILY MEDICINE CLINIC | Facility: CLINIC | Age: 26
End: 2021-12-08

## 2021-12-08 ENCOUNTER — FLU SHOT (OUTPATIENT)
Dept: FAMILY MEDICINE CLINIC | Facility: CLINIC | Age: 26
End: 2021-12-08

## 2021-12-08 DIAGNOSIS — Z23 IMMUNIZATION DUE: Primary | ICD-10-CM

## 2021-12-08 DIAGNOSIS — R41.840 ATTENTION AND CONCENTRATION DEFICIT: Primary | ICD-10-CM

## 2021-12-08 PROCEDURE — 90686 IIV4 VACC NO PRSV 0.5 ML IM: CPT | Performed by: FAMILY MEDICINE

## 2021-12-08 PROCEDURE — 90471 IMMUNIZATION ADMIN: CPT | Performed by: FAMILY MEDICINE

## 2021-12-08 NOTE — TELEPHONE ENCOUNTER
The patient is requesting a referral to [facility] on [address] for adhd testing.

## 2022-01-04 ENCOUNTER — IMMUNIZATION (OUTPATIENT)
Dept: FAMILY MEDICINE CLINIC | Facility: CLINIC | Age: 27
End: 2022-01-04

## 2022-01-04 DIAGNOSIS — Z23 IMMUNIZATION DUE: Primary | ICD-10-CM

## 2022-01-04 PROCEDURE — 91300 COVID-19 (PFIZER): CPT | Performed by: FAMILY MEDICINE

## 2022-01-04 PROCEDURE — 0004A COVID-19 (PFIZER): CPT | Performed by: FAMILY MEDICINE

## 2022-04-11 ENCOUNTER — TELEPHONE (OUTPATIENT)
Dept: FAMILY MEDICINE CLINIC | Facility: CLINIC | Age: 27
End: 2022-04-11

## 2022-04-11 NOTE — TELEPHONE ENCOUNTER
Contacted patient, I advised him I have sent a stat record request. He is unsure about his lab results and states that he feels well enough to wait until his appoinment on Friday, if the  Reviews his records and he needs to be seen more urgently he is asking for a return call

## 2022-04-11 NOTE — TELEPHONE ENCOUNTER
Caller: Tru Mckeon    Relationship: Self    Best call back number: [phone]     Caller requesting test results: THE PATIENT TRU     What test was performed: 04/09/2022    When was the test performed: FULL CBC, CMP, URINALYSIS, AND CT SCAN     Where was the test performed: [facility]    Additional notes: THE PATIENT REPORTS HE WENT IN TO THE ED AT [facility] ON 04/09/2022 FOR EXCESSIVE VOMITING, ABDOMINAL PAIN AND DIARRHEA AND NOW HAS BACK PAIN. HE REPORTS THE HOSPITAL TOLD HIM HE WAS DEHYDRATED AND HIS LACTATE WAS ELEVATED AND WAS TOLD HE HAD GASTROENTERITIS AND STATES HE WAS NOT TOLD ANYTHING FURTHER. THE PATIENT IS A MED STUDENT AND THE SOONEST APPOINTMENT HE COULD POSSIBLY DO WAS FOR THIS Friday 04/15/2022. THE PATIENT HAS SOME CONCERNS AND IS REQUESTING A CALL BACK IF DR TY COULD POSSIBLY GET THE RESULTS FROM [facility].     PLEASE CALL THE PATIENT AND ADVISE -[phone]

## 2022-04-15 ENCOUNTER — OFFICE VISIT (OUTPATIENT)
Dept: FAMILY MEDICINE CLINIC | Facility: CLINIC | Age: 27
End: 2022-04-15

## 2022-04-15 ENCOUNTER — LAB (OUTPATIENT)
Dept: LAB | Facility: HOSPITAL | Age: 27
End: 2022-04-15

## 2022-04-15 VITALS
SYSTOLIC BLOOD PRESSURE: 122 MMHG | DIASTOLIC BLOOD PRESSURE: 84 MMHG | HEART RATE: 83 BPM | WEIGHT: 216 LBS | HEIGHT: 69 IN | OXYGEN SATURATION: 99 % | BODY MASS INDEX: 31.99 KG/M2

## 2022-04-15 DIAGNOSIS — R10.11 RUQ PAIN: ICD-10-CM

## 2022-04-15 DIAGNOSIS — K52.9 GASTROENTERITIS: ICD-10-CM

## 2022-04-15 DIAGNOSIS — K52.9 GASTROENTERITIS: Primary | ICD-10-CM

## 2022-04-15 DIAGNOSIS — G47.33 OBSTRUCTIVE SLEEP APNEA: ICD-10-CM

## 2022-04-15 LAB
BASOPHILS # BLD AUTO: 0.04 10*3/MM3 (ref 0–0.2)
BASOPHILS NFR BLD AUTO: 0.5 % (ref 0–1.5)
BILIRUB UR QL STRIP: NEGATIVE
CLARITY UR: CLEAR
COLOR UR: YELLOW
DEPRECATED RDW RBC AUTO: 43.1 FL (ref 37–54)
EOSINOPHIL # BLD AUTO: 0.05 10*3/MM3 (ref 0–0.4)
EOSINOPHIL NFR BLD AUTO: 0.6 % (ref 0.3–6.2)
ERYTHROCYTE [DISTWIDTH] IN BLOOD BY AUTOMATED COUNT: 12.3 % (ref 12.3–15.4)
GLUCOSE UR STRIP-MCNC: NEGATIVE MG/DL
HCT VFR BLD AUTO: 49.1 % (ref 37.5–51)
HGB BLD-MCNC: 16.5 G/DL (ref 13–17.7)
HGB UR QL STRIP.AUTO: NEGATIVE
IMM GRANULOCYTES # BLD AUTO: 0.02 10*3/MM3 (ref 0–0.05)
IMM GRANULOCYTES NFR BLD AUTO: 0.3 % (ref 0–0.5)
KETONES UR QL STRIP: NEGATIVE
LEUKOCYTE ESTERASE UR QL STRIP.AUTO: NEGATIVE
LYMPHOCYTES # BLD AUTO: 2.26 10*3/MM3 (ref 0.7–3.1)
LYMPHOCYTES NFR BLD AUTO: 29.3 % (ref 19.6–45.3)
MCH RBC QN AUTO: 31.7 PG (ref 26.6–33)
MCHC RBC AUTO-ENTMCNC: 33.6 G/DL (ref 31.5–35.7)
MCV RBC AUTO: 94.2 FL (ref 79–97)
MONOCYTES # BLD AUTO: 0.52 10*3/MM3 (ref 0.1–0.9)
MONOCYTES NFR BLD AUTO: 6.7 % (ref 5–12)
NEUTROPHILS NFR BLD AUTO: 4.82 10*3/MM3 (ref 1.7–7)
NEUTROPHILS NFR BLD AUTO: 62.6 % (ref 42.7–76)
NITRITE UR QL STRIP: NEGATIVE
NRBC BLD AUTO-RTO: 0 /100 WBC (ref 0–0.2)
PH UR STRIP.AUTO: 6.5 [PH] (ref 5–8)
PLATELET # BLD AUTO: 273 10*3/MM3 (ref 140–450)
PMV BLD AUTO: 9.5 FL (ref 6–12)
PROT UR QL STRIP: NEGATIVE
RBC # BLD AUTO: 5.21 10*6/MM3 (ref 4.14–5.8)
SP GR UR STRIP: 1.02 (ref 1–1.03)
UROBILINOGEN UR QL STRIP: NORMAL
WBC NRBC COR # BLD: 7.71 10*3/MM3 (ref 3.4–10.8)

## 2022-04-15 PROCEDURE — 81003 URINALYSIS AUTO W/O SCOPE: CPT

## 2022-04-15 PROCEDURE — 80053 COMPREHEN METABOLIC PANEL: CPT

## 2022-04-15 PROCEDURE — 99214 OFFICE O/P EST MOD 30 MIN: CPT | Performed by: INTERNAL MEDICINE

## 2022-04-15 PROCEDURE — 85025 COMPLETE CBC W/AUTO DIFF WBC: CPT

## 2022-04-15 RX ORDER — DEXTROAMPHETAMINE SACCHARATE, AMPHETAMINE ASPARTATE, DEXTROAMPHETAMINE SULFATE AND AMPHETAMINE SULFATE 2.5; 2.5; 2.5; 2.5 MG/1; MG/1; MG/1; MG/1
TABLET ORAL
COMMUNITY
Start: 2022-04-14

## 2022-04-15 NOTE — PROGRESS NOTES
Chief Complaint   Patient presents with   • Nausea     [facility] f/u - scanned under media - nausea and vomiting    • Flank Pain     Possible from vomiting        HPI:  Ralph Mckeon is a 26 y.o. male who presents today for ER follow-up gastroenteritis.  Nausea and vomiting have improved.  He has some residual right flank and right upper quadrant pain.  He suspects it may be from vomiting.  He would like to discuss ongoing fatigue.  Recently started on Adderall for difficulty concentrating.  He does have a history of sleep apnea.  Currently not wearing CPAP machine.    ROS:  Constitutional: no fevers, night sweats or unexplained weight loss  Eyes: no vision changes  ENT: no runny nose, ear pain, sore throat  Cardio: no chest pain, palpitations  Pulm: no shortness of breath, wheezing, or cough  GI: no abdominal pain or changes in bowel movements  : no difficulty urinating  MSK: no difficulty ambulating, no joint pain  Neuro: no weakness, dizziness or headache  Psych: no trouble sleeping  Endo: no change in appetite      Past Medical History:   Diagnosis Date   • Anxiety       Family History   Problem Relation Age of Onset   • Heart disease Mother    • Cancer Mother    • Kidney disease Mother    • Diabetes Mother    • Diabetes Maternal Grandmother    • Cancer Maternal Grandmother    • Heart disease Maternal Grandmother    • Kidney disease Maternal Grandmother       Social History     Socioeconomic History   • Marital status: Single   Tobacco Use   • Smoking status: Never Smoker   • Smokeless tobacco: Never Used   Substance and Sexual Activity   • Alcohol use: Yes     Alcohol/week: 1.0 - 2.0 standard drink     Types: 1 - 2 Glasses of wine per week     Comment: socially   • Drug use: Never   • Sexual activity: Defer      Allergies   Allergen Reactions   • Ceclor [Cefaclor] Rash      Immunization History   Administered Date(s) Administered   • COVID-19 (PFIZER) PURPLE CAP 03/11/2021, 04/02/2021, 01/04/2022   • DTaP,  Unspecified 02/07/1994, 05/05/1994, 11/01/1994, 04/01/1998   • Flu Vaccine Quad PF >36MO 10/29/2019   • Flu Vaccine Split Quad 10/29/2019   • FluLaval/Fluarix/Fluzone >6 10/05/2020, 12/08/2021   • Hep B, Unspecified 1993, 02/09/1994, 11/01/1994   • Hepatitis A 09/04/2019   • Hepatitis B Vaccine Adult IM 06/01/2020, 10/05/2020   • Hib (PRP-T) 02/07/1994, 05/05/1994, 11/01/1994   • MMR 08/15/1995, 04/01/1998, 06/01/2020   • Meningococcal, Unspecified 03/04/2016   • PPD Test 06/17/2020   • Polio, Unspecified 02/07/1994, 05/05/1994, 11/01/1994, 04/01/1998   • Td, Not Adsorbed 08/11/2005   • Tdap 09/04/2019   • Varicella 09/11/2019, 10/30/2019        PE:  Vitals:    04/15/22 1319   BP: 122/84   Pulse: 83   SpO2: 99%      Body mass index is 31.45 kg/m².    Gen Appearance: NAD  HEENT: Normocephalic, PERRLA, no thyromegaly, trache midline  Heart: RRR, normal S1 and S2, no murmur  Lungs: CTA b/l, no wheezing, no crackles  Abdomen: Soft, non-tender, non-distended, no guarding and BSx4  MSK: Moves all extremities well, normal gait, no peripheral edema  Pulses: Palpable and equal b/l  Lymph nodes: No palpable lymphadenopathy   Neuro: No focal deficits      Current Outpatient Medications   Medication Sig Dispense Refill   • amphetamine-dextroamphetamine (ADDERALL) 10 MG tablet      • busPIRone (BUSPAR) 10 MG tablet Take 1 tablet by mouth 2 (Two) Times a Day. 180 tablet 3   • hydrOXYzine pamoate (VISTARIL) 25 MG capsule Take 25 mg by mouth 3 (Three) Times a Day As Needed for Itching or Anxiety.     • vitamin D (ERGOCALCIFEROL) 1.25 MG (18823 UT) capsule capsule TAKE 1 TABLET ONCE WEEKLY WITH MEALS. 12 capsule 0     No current facility-administered medications for this visit.      Gastroenteritis symptoms improving.  Rechecking labs today.  I suspect most of the abnormalities were due to dehydration.  He did have a CT scan in the ER that was reportedly normal.  Data deficient today.  Recommend establishing care with sleep  medicine for CPAP titration.    Diagnoses and all orders for this visit:    1. Gastroenteritis (Primary)  -     CBC & Differential; Future  -     Comprehensive Metabolic Panel; Future  -     Urinalysis With Culture If Indicated - Urine, Clean Catch; Future    2. Obstructive sleep apnea  -     Ambulatory Referral to Sleep Medicine    3. RUQ pain         No follow-ups on file.     Dictated Utilizing Dragon Dictation    Please note that portions of this note were completed with a voice recognition program.    Part of this note may be an electronic transcription/translation of spoken language to printed text using the Dragon Dictation System.

## 2022-04-16 LAB
ALBUMIN SERPL-MCNC: 4.7 G/DL (ref 3.5–5.2)
ALBUMIN/GLOB SERPL: 1.8 G/DL
ALP SERPL-CCNC: 71 U/L (ref 39–117)
ALT SERPL W P-5'-P-CCNC: 15 U/L (ref 1–41)
ANION GAP SERPL CALCULATED.3IONS-SCNC: 12.9 MMOL/L (ref 5–15)
AST SERPL-CCNC: 15 U/L (ref 1–40)
BILIRUB SERPL-MCNC: 0.4 MG/DL (ref 0–1.2)
BUN SERPL-MCNC: 8 MG/DL (ref 6–20)
BUN/CREAT SERPL: 8.5 (ref 7–25)
CALCIUM SPEC-SCNC: 9.8 MG/DL (ref 8.6–10.5)
CHLORIDE SERPL-SCNC: 107 MMOL/L (ref 98–107)
CO2 SERPL-SCNC: 23.1 MMOL/L (ref 22–29)
CREAT SERPL-MCNC: 0.94 MG/DL (ref 0.76–1.27)
EGFRCR SERPLBLD CKD-EPI 2021: 113.2 ML/MIN/1.73
GLOBULIN UR ELPH-MCNC: 2.6 GM/DL
GLUCOSE SERPL-MCNC: 85 MG/DL (ref 65–99)
POTASSIUM SERPL-SCNC: 4.5 MMOL/L (ref 3.5–5.2)
PROT SERPL-MCNC: 7.3 G/DL (ref 6–8.5)
SODIUM SERPL-SCNC: 143 MMOL/L (ref 136–145)

## 2022-08-17 DIAGNOSIS — F41.1 GENERALIZED ANXIETY DISORDER: ICD-10-CM

## 2022-08-17 RX ORDER — BUSPIRONE HYDROCHLORIDE 10 MG/1
TABLET ORAL
Qty: 180 TABLET | Refills: 1 | Status: SHIPPED | OUTPATIENT
Start: 2022-08-17

## 2022-08-17 NOTE — TELEPHONE ENCOUNTER
Rx Refill Note  Requested Prescriptions     Pending Prescriptions Disp Refills   • busPIRone (BUSPAR) 10 MG tablet [Pharmacy Med Name: BUSPIRONE HCL 10 MG TABLET] 180 tablet 3     Sig: TAKE 1 TABLET BY MOUTH TWICE A DAY      Last office visit with prescribing clinician: 4/15/2022      Next office visit with prescribing clinician: Visit date not found            Nae Bliss MA  08/17/22, 09:36 EDT

## 2024-02-21 ENCOUNTER — TELEPHONE (OUTPATIENT)
Dept: FAMILY MEDICINE CLINIC | Facility: CLINIC | Age: 29
End: 2024-02-21
Payer: COMMERCIAL

## 2024-02-21 NOTE — TELEPHONE ENCOUNTER
[facility] received a fax that requires your attention. The document has been indexed to the patient’s chart for your review.    Documents Description: [facility] CORRESPONDENCE    Name of Sender: [facility]    Date Indexed: 12-.

## 2024-06-04 ENCOUNTER — OFFICE VISIT (OUTPATIENT)
Dept: FAMILY MEDICINE CLINIC | Facility: CLINIC | Age: 29
End: 2024-06-04
Payer: COMMERCIAL

## 2024-06-04 VITALS
DIASTOLIC BLOOD PRESSURE: 84 MMHG | SYSTOLIC BLOOD PRESSURE: 138 MMHG | OXYGEN SATURATION: 99 % | HEIGHT: 69 IN | WEIGHT: 233.4 LBS | BODY MASS INDEX: 34.57 KG/M2 | HEART RATE: 71 BPM

## 2024-06-04 DIAGNOSIS — F90.9 ATTENTION DEFICIT HYPERACTIVITY DISORDER (ADHD), UNSPECIFIED ADHD TYPE: Primary | ICD-10-CM

## 2024-06-04 PROCEDURE — 99214 OFFICE O/P EST MOD 30 MIN: CPT | Performed by: INTERNAL MEDICINE

## 2024-06-04 PROCEDURE — 1159F MED LIST DOCD IN RCRD: CPT | Performed by: INTERNAL MEDICINE

## 2024-06-04 PROCEDURE — 1125F AMNT PAIN NOTED PAIN PRSNT: CPT | Performed by: INTERNAL MEDICINE

## 2024-06-04 PROCEDURE — 1160F RVW MEDS BY RX/DR IN RCRD: CPT | Performed by: INTERNAL MEDICINE

## 2024-06-04 RX ORDER — LISDEXAMFETAMINE DIMESYLATE 40 MG/1
40 CAPSULE ORAL DAILY
COMMUNITY
Start: 2024-04-29 | End: 2024-06-06 | Stop reason: SDUPTHER

## 2024-06-04 NOTE — PROGRESS NOTES
Chief Complaint   Patient presents with    [facility]     Just recently moved back to [place].        HPI:  Ralph Mckeon is a 28 y.o. male who presents today for follow-up ADHD, last seen in office 2022.  Was under the care of another provider in the meantime due to location.  He would like to resume Vyvanse 40 mg for ADHD.    ROS:  Constitutional: no fevers, night sweats or unexplained weight loss  Eyes: no vision changes  ENT: no runny nose, ear pain, sore throat  Cardio: no chest pain, palpitations  Pulm: no shortness of breath, wheezing, or cough  GI: no abdominal pain or changes in bowel movements  : no difficulty urinating  MSK: no difficulty ambulating, no joint pain  Neuro: no weakness, dizziness or headache  Psych: no trouble sleeping  Endo: no change in appetite      Past Medical History:   Diagnosis Date    ADHD (attention deficit hyperactivity disorder) 01/20/2022    Started with Adderall, but ineffective. Tried concerta as well. Vyvanse best option    Anxiety       Family History   Problem Relation Age of Onset    Heart disease Mother     Cancer Mother     Kidney disease Mother     Diabetes Mother     Diabetes Maternal Grandmother     Cancer Maternal Grandmother     Heart disease Maternal Grandmother     Kidney disease Maternal Grandmother     Hyperlipidemia Father     COPD Maternal Grandfather     Diabetes Maternal Grandfather     Heart disease Maternal Grandfather     Kidney disease Maternal Grandfather     Stroke Maternal Grandfather         All maternal grandfather problems 2/2 uncontrolled T2DM    Cancer Paternal Grandmother     Diabetes Paternal Grandmother       Social History     Socioeconomic History    Marital status: Single   Tobacco Use    Smoking status: Never    Smokeless tobacco: Never   Vaping Use    Vaping status: Never Used   Substance and Sexual Activity    Alcohol use: Yes     Alcohol/week: 1.0 - 2.0 standard drink of alcohol     Types: 1 - 2 Glasses of wine per week      Comment: socially    Drug use: Never    Sexual activity: Yes     Partners: Female     Birth control/protection: Nexplanon      Allergies   Allergen Reactions    Ceclor [Cefaclor] Rash      Immunization History   Administered Date(s) Administered    COVID-19 (PFIZER) Purple Cap Monovalent 03/11/2021, 04/02/2021, 01/04/2022    COVID-19 F23 (MODERNA) 12YRS+ (SPIKEVAX) 10/13/2023    DTaP, Unspecified 02/07/1994, 05/05/1994, 11/01/1994, 04/01/1998    Flu Vaccine Quad PF >36MO 10/29/2019    Flu Vaccine Split Quad 10/29/2019    Fluzone (or Fluarix & Flulaval for VFC) >6mos 10/05/2020, 12/08/2021    Hep B, Unspecified 1993, 02/09/1994, 11/01/1994    Hepatitis A 09/04/2019    Hepatitis B 06/01/2020, 10/05/2020    Hib (PRP-T) 02/07/1994, 05/05/1994, 11/01/1994    MMR 08/15/1995, 04/01/1998, 06/01/2020    Meningococcal, Unspecified 03/04/2016    PPD Test 06/17/2020    Polio, Unspecified 02/07/1994, 05/05/1994, 11/01/1994, 04/01/1998    Td, Not Adsorbed 08/11/2005    Tdap 09/04/2019    Varicella 09/11/2019, 10/30/2019        PE:  Vitals:    06/04/24 1110   BP: 138/84   Pulse: 71   SpO2: 99%      Body mass index is 33.98 kg/m².    Gen Appearance: NAD  HEENT: Normocephalic, PERRLA, no thyromegaly, trache midline  Heart: RRR, normal S1 and S2, no murmur  Lungs: CTA b/l, no wheezing, no crackles  Abdomen: Soft, non-tender, non-distended, no guarding and BSx4  MSK: Moves all extremities well, normal gait, no peripheral edema  Pulses: Palpable and equal b/l  Lymph nodes: No palpable lymphadenopathy   Neuro: No focal deficits      Current Outpatient Medications   Medication Sig Dispense Refill    Vyvanse 40 MG capsule Take 1 capsule by mouth Daily       No current facility-administered medications for this visit.        Diagnoses and all orders for this visit:    1. Attention deficit hyperactivity disorder (ADHD), unspecified ADHD type (Primary)    Symptoms well-controlled on Vyvanse, continue.  CSA and UDS today.  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  He would like to stay on 40 mg right now, having some side effects of diarrhea and decreased appetite.    Return in about 3 months (around 9/4/2024).     Dictated Utilizing Dragon Dictation    Please note that portions of this note were completed with a voice recognition program.    Part of this note may be an electronic transcription/translation of spoken language to printed text using the Dragon Dictation System.

## 2024-06-06 DIAGNOSIS — F90.9 ATTENTION DEFICIT HYPERACTIVITY DISORDER (ADHD), UNSPECIFIED ADHD TYPE: Primary | ICD-10-CM

## 2024-06-06 RX ORDER — LISDEXAMFETAMINE DIMESYLATE 40 MG/1
40 CAPSULE ORAL DAILY
Qty: 30 CAPSULE | Refills: 0 | Status: SHIPPED | OUTPATIENT
Start: 2024-06-06

## 2024-06-06 NOTE — TELEPHONE ENCOUNTER
Rx Refill Note  Requested Prescriptions     Pending Prescriptions Disp Refills    Vyvanse 40 MG capsule       Sig: Take 1 capsule by mouth Daily      Last office visit with prescribing clinician: 6/4/2024   Last telemedicine visit with prescribing clinician: Visit date not found   Next office visit with prescribing clinician: Visit date not found       Anna Ryan MA  06/06/24, 10:37 EDT

## 2024-06-12 LAB — DRUGS UR: NORMAL

## 2024-07-07 DIAGNOSIS — F90.9 ATTENTION DEFICIT HYPERACTIVITY DISORDER (ADHD), UNSPECIFIED ADHD TYPE: ICD-10-CM

## 2024-07-08 RX ORDER — LISDEXAMFETAMINE DIMESYLATE 40 MG/1
40 CAPSULE ORAL DAILY
Qty: 30 CAPSULE | Refills: 0 | Status: SHIPPED | OUTPATIENT
Start: 2024-07-08

## 2024-07-08 NOTE — TELEPHONE ENCOUNTER
Rx Refill Note  Requested Prescriptions     Pending Prescriptions Disp Refills    Vyvanse 40 MG capsule 30 capsule 0     Sig: Take 1 capsule by mouth Daily      Last office visit with prescribing clinician: 6/4/2024   Last telemedicine visit with prescribing clinician: Visit date not found   Next office visit with prescribing clinician: Visit date not found   Return in about 3 months 9/4/24      Anna Ryan MA  07/08/24, 09:42 EDT

## 2024-08-06 DIAGNOSIS — F90.9 ATTENTION DEFICIT HYPERACTIVITY DISORDER (ADHD), UNSPECIFIED ADHD TYPE: ICD-10-CM

## 2024-08-06 NOTE — TELEPHONE ENCOUNTER
Rx Refill Note  Requested Prescriptions     Pending Prescriptions Disp Refills    Vyvanse 40 MG capsule 30 capsule 0     Sig: Take 1 capsule by mouth Daily      Last office visit with prescribing clinician: 6/4/2024   Last telemedicine visit with prescribing clinician: Visit date not found   Next office visit with prescribing clinician: return in about 3 months 9/4/2024  OK FOR HUB TO RELY   Anna Ryan MA  08/06/24, 14:17 EDT

## 2024-08-09 RX ORDER — LISDEXAMFETAMINE DIMESYLATE 40 MG/1
40 CAPSULE ORAL DAILY
Qty: 30 CAPSULE | Refills: 0 | Status: SHIPPED | OUTPATIENT
Start: 2024-08-09